# Patient Record
Sex: FEMALE | Race: WHITE | NOT HISPANIC OR LATINO | Employment: FULL TIME | URBAN - METROPOLITAN AREA
[De-identification: names, ages, dates, MRNs, and addresses within clinical notes are randomized per-mention and may not be internally consistent; named-entity substitution may affect disease eponyms.]

---

## 2022-05-02 ENCOUNTER — PRE-ADMISSION TESTING (OUTPATIENT)
Dept: ADMISSIONS | Facility: MEDICAL CENTER | Age: 42
DRG: 621 | End: 2022-05-02
Attending: COLON & RECTAL SURGERY
Payer: COMMERCIAL

## 2022-05-02 RX ORDER — ZOLPIDEM TARTRATE 10 MG/1
10 TABLET ORAL NIGHTLY PRN
COMMUNITY

## 2022-05-03 PROCEDURE — RXMED WILLOW AMBULATORY MEDICATION CHARGE: Performed by: COLON & RECTAL SURGERY

## 2022-05-11 ENCOUNTER — HOSPITAL ENCOUNTER (INPATIENT)
Facility: MEDICAL CENTER | Age: 42
LOS: 1 days | DRG: 621 | End: 2022-05-12
Attending: COLON & RECTAL SURGERY | Admitting: COLON & RECTAL SURGERY
Payer: COMMERCIAL

## 2022-05-11 ENCOUNTER — PHARMACY VISIT (OUTPATIENT)
Dept: PHARMACY | Facility: MEDICAL CENTER | Age: 42
End: 2022-05-11
Payer: COMMERCIAL

## 2022-05-11 ENCOUNTER — ANESTHESIA EVENT (OUTPATIENT)
Dept: SURGERY | Facility: MEDICAL CENTER | Age: 42
DRG: 621 | End: 2022-05-11
Payer: COMMERCIAL

## 2022-05-11 ENCOUNTER — ANESTHESIA (OUTPATIENT)
Dept: SURGERY | Facility: MEDICAL CENTER | Age: 42
DRG: 621 | End: 2022-05-11
Payer: COMMERCIAL

## 2022-05-11 LAB
25(OH)D3 SERPL-MCNC: 44 NG/ML (ref 30–100)
ALBUMIN SERPL BCP-MCNC: 4.2 G/DL (ref 3.2–4.9)
ALBUMIN/GLOB SERPL: 1.6 G/DL
ALP SERPL-CCNC: 81 U/L (ref 30–99)
ALT SERPL-CCNC: 17 U/L (ref 2–50)
ANION GAP SERPL CALC-SCNC: 13 MMOL/L (ref 7–16)
AST SERPL-CCNC: 17 U/L (ref 12–45)
BASOPHILS # BLD AUTO: 0.3 % (ref 0–1.8)
BASOPHILS # BLD: 0.03 K/UL (ref 0–0.12)
BILIRUB SERPL-MCNC: 0.5 MG/DL (ref 0.1–1.5)
BUN SERPL-MCNC: 12 MG/DL (ref 8–22)
CALCIUM SERPL-MCNC: 9 MG/DL (ref 8.5–10.5)
CHLORIDE SERPL-SCNC: 102 MMOL/L (ref 96–112)
CHOLEST SERPL-MCNC: 225 MG/DL (ref 100–199)
CO2 SERPL-SCNC: 23 MMOL/L (ref 20–33)
CREAT SERPL-MCNC: 0.58 MG/DL (ref 0.5–1.4)
EOSINOPHIL # BLD AUTO: 0.13 K/UL (ref 0–0.51)
EOSINOPHIL NFR BLD: 1.5 % (ref 0–6.9)
ERYTHROCYTE [DISTWIDTH] IN BLOOD BY AUTOMATED COUNT: 37.7 FL (ref 35.9–50)
FERRITIN SERPL-MCNC: 171 NG/ML (ref 10–291)
FOLATE SERPL-MCNC: 21.1 NG/ML
GFR SERPLBLD CREATININE-BSD FMLA CKD-EPI: 116 ML/MIN/1.73 M 2
GLOBULIN SER CALC-MCNC: 2.7 G/DL (ref 1.9–3.5)
GLUCOSE SERPL-MCNC: 84 MG/DL (ref 65–99)
HCG SERPL QL: NEGATIVE
HCT VFR BLD AUTO: 40.1 % (ref 37–47)
HDLC SERPL-MCNC: 36 MG/DL
HGB BLD-MCNC: 13.9 G/DL (ref 12–16)
IMM GRANULOCYTES # BLD AUTO: 0.04 K/UL (ref 0–0.11)
IMM GRANULOCYTES NFR BLD AUTO: 0.5 % (ref 0–0.9)
INR PPP: 1 (ref 0.87–1.13)
IRON SERPL-MCNC: 47 UG/DL (ref 40–170)
LDLC SERPL CALC-MCNC: 140 MG/DL
LYMPHOCYTES # BLD AUTO: 2.25 K/UL (ref 1–4.8)
LYMPHOCYTES NFR BLD: 26.2 % (ref 22–41)
MCH RBC QN AUTO: 30.7 PG (ref 27–33)
MCHC RBC AUTO-ENTMCNC: 34.7 G/DL (ref 33.6–35)
MCV RBC AUTO: 88.5 FL (ref 81.4–97.8)
MONOCYTES # BLD AUTO: 0.51 K/UL (ref 0–0.85)
MONOCYTES NFR BLD AUTO: 5.9 % (ref 0–13.4)
NEUTROPHILS # BLD AUTO: 5.63 K/UL (ref 2–7.15)
NEUTROPHILS NFR BLD: 65.6 % (ref 44–72)
NRBC # BLD AUTO: 0 K/UL
NRBC BLD-RTO: 0 /100 WBC
PLATELET # BLD AUTO: 245 K/UL (ref 164–446)
PMV BLD AUTO: 9.8 FL (ref 9–12.9)
POTASSIUM SERPL-SCNC: 3.7 MMOL/L (ref 3.6–5.5)
PREALB SERPL-MCNC: 25.6 MG/DL (ref 18–38)
PROT SERPL-MCNC: 6.9 G/DL (ref 6–8.2)
PROTHROMBIN TIME: 12.9 SEC (ref 12–14.6)
RBC # BLD AUTO: 4.53 M/UL (ref 4.2–5.4)
SODIUM SERPL-SCNC: 138 MMOL/L (ref 135–145)
TRANSFERRIN SERPL-MCNC: 243 MG/DL (ref 200–370)
TRIGL SERPL-MCNC: 247 MG/DL (ref 0–149)
VIT B12 SERPL-MCNC: 528 PG/ML (ref 211–911)
WBC # BLD AUTO: 8.6 K/UL (ref 4.8–10.8)

## 2022-05-11 PROCEDURE — 700101 HCHG RX REV CODE 250: Performed by: COLON & RECTAL SURGERY

## 2022-05-11 PROCEDURE — 502570 HCHG PACK, GASTRIC BANDING: Performed by: COLON & RECTAL SURGERY

## 2022-05-11 PROCEDURE — 84134 ASSAY OF PREALBUMIN: CPT

## 2022-05-11 PROCEDURE — 700101 HCHG RX REV CODE 250: Performed by: STUDENT IN AN ORGANIZED HEALTH CARE EDUCATION/TRAINING PROGRAM

## 2022-05-11 PROCEDURE — 84207 ASSAY OF VITAMIN B-6: CPT

## 2022-05-11 PROCEDURE — 84630 ASSAY OF ZINC: CPT

## 2022-05-11 PROCEDURE — 0BQT4ZZ REPAIR DIAPHRAGM, PERCUTANEOUS ENDOSCOPIC APPROACH: ICD-10-PCS | Performed by: COLON & RECTAL SURGERY

## 2022-05-11 PROCEDURE — 700111 HCHG RX REV CODE 636 W/ 250 OVERRIDE (IP): Performed by: COLON & RECTAL SURGERY

## 2022-05-11 PROCEDURE — 82306 VITAMIN D 25 HYDROXY: CPT

## 2022-05-11 PROCEDURE — 82728 ASSAY OF FERRITIN: CPT

## 2022-05-11 PROCEDURE — 84425 ASSAY OF VITAMIN B-1: CPT

## 2022-05-11 PROCEDURE — 700105 HCHG RX REV CODE 258: Performed by: COLON & RECTAL SURGERY

## 2022-05-11 PROCEDURE — A9270 NON-COVERED ITEM OR SERVICE: HCPCS | Performed by: COLON & RECTAL SURGERY

## 2022-05-11 PROCEDURE — 83540 ASSAY OF IRON: CPT

## 2022-05-11 PROCEDURE — A9270 NON-COVERED ITEM OR SERVICE: HCPCS | Performed by: STUDENT IN AN ORGANIZED HEALTH CARE EDUCATION/TRAINING PROGRAM

## 2022-05-11 PROCEDURE — 80061 LIPID PANEL: CPT

## 2022-05-11 PROCEDURE — 160041 HCHG SURGERY MINUTES - EA ADDL 1 MIN LEVEL 4: Performed by: COLON & RECTAL SURGERY

## 2022-05-11 PROCEDURE — 501570 HCHG TROCAR, SEPARATOR: Performed by: COLON & RECTAL SURGERY

## 2022-05-11 PROCEDURE — 700101 HCHG RX REV CODE 250: Performed by: ANESTHESIOLOGY

## 2022-05-11 PROCEDURE — 160029 HCHG SURGERY MINUTES - 1ST 30 MINS LEVEL 4: Performed by: COLON & RECTAL SURGERY

## 2022-05-11 PROCEDURE — 80053 COMPREHEN METABOLIC PANEL: CPT

## 2022-05-11 PROCEDURE — 700102 HCHG RX REV CODE 250 W/ 637 OVERRIDE(OP): Performed by: COLON & RECTAL SURGERY

## 2022-05-11 PROCEDURE — 84703 CHORIONIC GONADOTROPIN ASSAY: CPT

## 2022-05-11 PROCEDURE — 160035 HCHG PACU - 1ST 60 MINS PHASE I: Performed by: COLON & RECTAL SURGERY

## 2022-05-11 PROCEDURE — 160002 HCHG RECOVERY MINUTES (STAT): Performed by: COLON & RECTAL SURGERY

## 2022-05-11 PROCEDURE — 501399 HCHG SPECIMAN BAG, ENDO CATC: Performed by: COLON & RECTAL SURGERY

## 2022-05-11 PROCEDURE — 700102 HCHG RX REV CODE 250 W/ 637 OVERRIDE(OP): Performed by: ANESTHESIOLOGY

## 2022-05-11 PROCEDURE — 85025 COMPLETE CBC W/AUTO DIFF WBC: CPT

## 2022-05-11 PROCEDURE — 160048 HCHG OR STATISTICAL LEVEL 1-5: Performed by: COLON & RECTAL SURGERY

## 2022-05-11 PROCEDURE — 88307 TISSUE EXAM BY PATHOLOGIST: CPT

## 2022-05-11 PROCEDURE — 82746 ASSAY OF FOLIC ACID SERUM: CPT

## 2022-05-11 PROCEDURE — 700111 HCHG RX REV CODE 636 W/ 250 OVERRIDE (IP): Performed by: STUDENT IN AN ORGANIZED HEALTH CARE EDUCATION/TRAINING PROGRAM

## 2022-05-11 PROCEDURE — A9270 NON-COVERED ITEM OR SERVICE: HCPCS | Performed by: ANESTHESIOLOGY

## 2022-05-11 PROCEDURE — 700111 HCHG RX REV CODE 636 W/ 250 OVERRIDE (IP): Performed by: ANESTHESIOLOGY

## 2022-05-11 PROCEDURE — 84466 ASSAY OF TRANSFERRIN: CPT

## 2022-05-11 PROCEDURE — 700102 HCHG RX REV CODE 250 W/ 637 OVERRIDE(OP): Performed by: STUDENT IN AN ORGANIZED HEALTH CARE EDUCATION/TRAINING PROGRAM

## 2022-05-11 PROCEDURE — 501838 HCHG SUTURE GENERAL: Performed by: COLON & RECTAL SURGERY

## 2022-05-11 PROCEDURE — 160009 HCHG ANES TIME/MIN: Performed by: COLON & RECTAL SURGERY

## 2022-05-11 PROCEDURE — 82607 VITAMIN B-12: CPT

## 2022-05-11 PROCEDURE — 770001 HCHG ROOM/CARE - MED/SURG/GYN PRIV*

## 2022-05-11 PROCEDURE — 0DB64Z3 EXCISION OF STOMACH, PERCUTANEOUS ENDOSCOPIC APPROACH, VERTICAL: ICD-10-PCS | Performed by: COLON & RECTAL SURGERY

## 2022-05-11 PROCEDURE — 00797 ANES IPER UPR ABD GSTR PX MO: CPT | Performed by: ANESTHESIOLOGY

## 2022-05-11 PROCEDURE — 501497 HCHG SURGICLIP: Performed by: COLON & RECTAL SURGERY

## 2022-05-11 PROCEDURE — 85610 PROTHROMBIN TIME: CPT

## 2022-05-11 PROCEDURE — 160036 HCHG PACU - EA ADDL 30 MINS PHASE I: Performed by: COLON & RECTAL SURGERY

## 2022-05-11 PROCEDURE — 502240 HCHG MISC OR SUPPLY RC 0272: Performed by: COLON & RECTAL SURGERY

## 2022-05-11 RX ORDER — ACETAMINOPHEN 10 MG/ML
1 INJECTION, SOLUTION INTRAVENOUS ONCE
Status: COMPLETED | OUTPATIENT
Start: 2022-05-11 | End: 2022-05-11

## 2022-05-11 RX ORDER — ALBUTEROL SULFATE 2.5 MG/3ML
2.5 SOLUTION RESPIRATORY (INHALATION)
Status: DISCONTINUED | OUTPATIENT
Start: 2022-05-11 | End: 2022-05-11 | Stop reason: HOSPADM

## 2022-05-11 RX ORDER — GABAPENTIN 300 MG/1
300 CAPSULE ORAL ONCE
Status: COMPLETED | OUTPATIENT
Start: 2022-05-11 | End: 2022-05-11

## 2022-05-11 RX ORDER — HYDROMORPHONE HYDROCHLORIDE 1 MG/ML
0.6 INJECTION, SOLUTION INTRAMUSCULAR; INTRAVENOUS; SUBCUTANEOUS
Status: DISCONTINUED | OUTPATIENT
Start: 2022-05-11 | End: 2022-05-11 | Stop reason: HOSPADM

## 2022-05-11 RX ORDER — ACETAMINOPHEN 500 MG
1000 TABLET ORAL EVERY 6 HOURS PRN
Status: DISCONTINUED | OUTPATIENT
Start: 2022-05-16 | End: 2022-05-12 | Stop reason: HOSPADM

## 2022-05-11 RX ORDER — DIPHENHYDRAMINE HYDROCHLORIDE 50 MG/ML
25 INJECTION INTRAMUSCULAR; INTRAVENOUS EVERY 6 HOURS PRN
Status: DISCONTINUED | OUTPATIENT
Start: 2022-05-11 | End: 2022-05-12 | Stop reason: HOSPADM

## 2022-05-11 RX ORDER — CEFOTETAN DISODIUM 2 G/20ML
INJECTION, POWDER, FOR SOLUTION INTRAMUSCULAR; INTRAVENOUS PRN
Status: DISCONTINUED | OUTPATIENT
Start: 2022-05-11 | End: 2022-05-11 | Stop reason: SURG

## 2022-05-11 RX ORDER — OXYCODONE HCL 5 MG/5 ML
10 SOLUTION, ORAL ORAL
Status: COMPLETED | OUTPATIENT
Start: 2022-05-11 | End: 2022-05-11

## 2022-05-11 RX ORDER — OXYCODONE HCL 5 MG/5 ML
5 SOLUTION, ORAL ORAL
Status: DISCONTINUED | OUTPATIENT
Start: 2022-05-11 | End: 2022-05-12

## 2022-05-11 RX ORDER — DEXAMETHASONE SODIUM PHOSPHATE 4 MG/ML
INJECTION, SOLUTION INTRA-ARTICULAR; INTRALESIONAL; INTRAMUSCULAR; INTRAVENOUS; SOFT TISSUE PRN
Status: DISCONTINUED | OUTPATIENT
Start: 2022-05-11 | End: 2022-05-11 | Stop reason: SURG

## 2022-05-11 RX ORDER — SODIUM CHLORIDE, SODIUM LACTATE, POTASSIUM CHLORIDE, CALCIUM CHLORIDE 600; 310; 30; 20 MG/100ML; MG/100ML; MG/100ML; MG/100ML
INJECTION, SOLUTION INTRAVENOUS CONTINUOUS
Status: ACTIVE | OUTPATIENT
Start: 2022-05-11 | End: 2022-05-11

## 2022-05-11 RX ORDER — DIPHENHYDRAMINE HYDROCHLORIDE 50 MG/ML
12.5 INJECTION INTRAMUSCULAR; INTRAVENOUS EVERY 6 HOURS PRN
Status: DISCONTINUED | OUTPATIENT
Start: 2022-05-11 | End: 2022-05-12 | Stop reason: HOSPADM

## 2022-05-11 RX ORDER — SODIUM CHLORIDE AND POTASSIUM CHLORIDE 150; 900 MG/100ML; MG/100ML
INJECTION, SOLUTION INTRAVENOUS CONTINUOUS
Status: DISCONTINUED | OUTPATIENT
Start: 2022-05-11 | End: 2022-05-12 | Stop reason: HOSPADM

## 2022-05-11 RX ORDER — ACETAMINOPHEN 500 MG
1000 TABLET ORAL EVERY 6 HOURS
Status: DISCONTINUED | OUTPATIENT
Start: 2022-05-12 | End: 2022-05-12 | Stop reason: HOSPADM

## 2022-05-11 RX ORDER — CALCIUM CARBONATE 500 MG/1
500 TABLET, CHEWABLE ORAL
Status: DISCONTINUED | OUTPATIENT
Start: 2022-05-11 | End: 2022-05-12 | Stop reason: HOSPADM

## 2022-05-11 RX ORDER — ACETAMINOPHEN 10 MG/ML
1000 INJECTION, SOLUTION INTRAVENOUS EVERY 6 HOURS
Status: COMPLETED | OUTPATIENT
Start: 2022-05-11 | End: 2022-05-12

## 2022-05-11 RX ORDER — OXYCODONE HCL 5 MG/5 ML
10 SOLUTION, ORAL ORAL
Status: DISCONTINUED | OUTPATIENT
Start: 2022-05-11 | End: 2022-05-12

## 2022-05-11 RX ORDER — MEPERIDINE HYDROCHLORIDE 25 MG/ML
12.5 INJECTION INTRAMUSCULAR; INTRAVENOUS; SUBCUTANEOUS
Status: DISCONTINUED | OUTPATIENT
Start: 2022-05-11 | End: 2022-05-11 | Stop reason: HOSPADM

## 2022-05-11 RX ORDER — ENALAPRILAT 1.25 MG/ML
2.5 INJECTION INTRAVENOUS EVERY 6 HOURS PRN
Status: DISCONTINUED | OUTPATIENT
Start: 2022-05-11 | End: 2022-05-12 | Stop reason: HOSPADM

## 2022-05-11 RX ORDER — DIPHENHYDRAMINE HYDROCHLORIDE 50 MG/ML
12.5 INJECTION INTRAMUSCULAR; INTRAVENOUS
Status: DISCONTINUED | OUTPATIENT
Start: 2022-05-11 | End: 2022-05-11 | Stop reason: HOSPADM

## 2022-05-11 RX ORDER — OXYCODONE HCL 10 MG/1
10 TABLET, FILM COATED, EXTENDED RELEASE ORAL ONCE
Status: COMPLETED | OUTPATIENT
Start: 2022-05-11 | End: 2022-05-11

## 2022-05-11 RX ORDER — SODIUM CHLORIDE, SODIUM LACTATE, POTASSIUM CHLORIDE, AND CALCIUM CHLORIDE .6; .31; .03; .02 G/100ML; G/100ML; G/100ML; G/100ML
500 INJECTION, SOLUTION INTRAVENOUS
Status: DISCONTINUED | OUTPATIENT
Start: 2022-05-11 | End: 2022-05-12 | Stop reason: HOSPADM

## 2022-05-11 RX ORDER — BUPIVACAINE HYDROCHLORIDE AND EPINEPHRINE 5; 5 MG/ML; UG/ML
INJECTION, SOLUTION PERINEURAL
Status: DISCONTINUED | OUTPATIENT
Start: 2022-05-11 | End: 2022-05-11 | Stop reason: HOSPADM

## 2022-05-11 RX ORDER — PROMETHAZINE HYDROCHLORIDE 25 MG/1
25 SUPPOSITORY RECTAL EVERY 4 HOURS PRN
Status: DISCONTINUED | OUTPATIENT
Start: 2022-05-11 | End: 2022-05-12 | Stop reason: HOSPADM

## 2022-05-11 RX ORDER — ENOXAPARIN SODIUM 100 MG/ML
40 INJECTION SUBCUTANEOUS
Status: DISCONTINUED | OUTPATIENT
Start: 2022-05-12 | End: 2022-05-12 | Stop reason: HOSPADM

## 2022-05-11 RX ORDER — HYDROMORPHONE HYDROCHLORIDE 1 MG/ML
0.2 INJECTION, SOLUTION INTRAMUSCULAR; INTRAVENOUS; SUBCUTANEOUS
Status: DISCONTINUED | OUTPATIENT
Start: 2022-05-11 | End: 2022-05-11 | Stop reason: HOSPADM

## 2022-05-11 RX ORDER — SCOLOPAMINE TRANSDERMAL SYSTEM 1 MG/1
1 PATCH, EXTENDED RELEASE TRANSDERMAL
Status: DISCONTINUED | OUTPATIENT
Start: 2022-05-11 | End: 2022-05-12 | Stop reason: HOSPADM

## 2022-05-11 RX ORDER — ONDANSETRON 2 MG/ML
4 INJECTION INTRAMUSCULAR; INTRAVENOUS EVERY 4 HOURS PRN
Status: DISCONTINUED | OUTPATIENT
Start: 2022-05-11 | End: 2022-05-12 | Stop reason: HOSPADM

## 2022-05-11 RX ORDER — HALOPERIDOL 5 MG/ML
1 INJECTION INTRAMUSCULAR
Status: DISCONTINUED | OUTPATIENT
Start: 2022-05-11 | End: 2022-05-11 | Stop reason: HOSPADM

## 2022-05-11 RX ORDER — ONDANSETRON 2 MG/ML
4 INJECTION INTRAMUSCULAR; INTRAVENOUS
Status: DISCONTINUED | OUTPATIENT
Start: 2022-05-11 | End: 2022-05-11 | Stop reason: HOSPADM

## 2022-05-11 RX ORDER — DIPHENHYDRAMINE HCL 25 MG
25 TABLET ORAL EVERY 6 HOURS PRN
Status: DISCONTINUED | OUTPATIENT
Start: 2022-05-11 | End: 2022-05-12 | Stop reason: HOSPADM

## 2022-05-11 RX ORDER — OXYCODONE HCL 5 MG/5 ML
5 SOLUTION, ORAL ORAL
Status: COMPLETED | OUTPATIENT
Start: 2022-05-11 | End: 2022-05-11

## 2022-05-11 RX ORDER — GABAPENTIN 300 MG/1
300 CAPSULE ORAL 3 TIMES DAILY
Status: DISCONTINUED | OUTPATIENT
Start: 2022-05-11 | End: 2022-05-12 | Stop reason: HOSPADM

## 2022-05-11 RX ORDER — SODIUM CHLORIDE, SODIUM GLUCONATE, SODIUM ACETATE, POTASSIUM CHLORIDE AND MAGNESIUM CHLORIDE 526; 502; 368; 37; 30 MG/100ML; MG/100ML; MG/100ML; MG/100ML; MG/100ML
500 INJECTION, SOLUTION INTRAVENOUS CONTINUOUS
Status: DISCONTINUED | OUTPATIENT
Start: 2022-05-11 | End: 2022-05-11 | Stop reason: HOSPADM

## 2022-05-11 RX ORDER — HYDROMORPHONE HYDROCHLORIDE 1 MG/ML
0.5 INJECTION, SOLUTION INTRAMUSCULAR; INTRAVENOUS; SUBCUTANEOUS
Status: DISCONTINUED | OUTPATIENT
Start: 2022-05-11 | End: 2022-05-12 | Stop reason: HOSPADM

## 2022-05-11 RX ORDER — HYDRALAZINE HYDROCHLORIDE 20 MG/ML
5 INJECTION INTRAMUSCULAR; INTRAVENOUS
Status: DISCONTINUED | OUTPATIENT
Start: 2022-05-11 | End: 2022-05-11 | Stop reason: HOSPADM

## 2022-05-11 RX ORDER — HYDROMORPHONE HYDROCHLORIDE 1 MG/ML
0.4 INJECTION, SOLUTION INTRAMUSCULAR; INTRAVENOUS; SUBCUTANEOUS
Status: DISCONTINUED | OUTPATIENT
Start: 2022-05-11 | End: 2022-05-11 | Stop reason: HOSPADM

## 2022-05-11 RX ORDER — ONDANSETRON 2 MG/ML
INJECTION INTRAMUSCULAR; INTRAVENOUS PRN
Status: DISCONTINUED | OUTPATIENT
Start: 2022-05-11 | End: 2022-05-11 | Stop reason: SURG

## 2022-05-11 RX ORDER — HALOPERIDOL 5 MG/ML
1 INJECTION INTRAMUSCULAR EVERY 6 HOURS PRN
Status: DISCONTINUED | OUTPATIENT
Start: 2022-05-11 | End: 2022-05-12 | Stop reason: HOSPADM

## 2022-05-11 RX ADMIN — ACETAMINOPHEN 1 G: 10 INJECTION, SOLUTION INTRAVENOUS at 15:36

## 2022-05-11 RX ADMIN — ONDANSETRON 4 MG: 2 INJECTION INTRAMUSCULAR; INTRAVENOUS at 16:43

## 2022-05-11 RX ADMIN — OXYCODONE HYDROCHLORIDE 10 MG: 10 TABLET, FILM COATED, EXTENDED RELEASE ORAL at 15:23

## 2022-05-11 RX ADMIN — SODIUM CHLORIDE, POTASSIUM CHLORIDE, SODIUM LACTATE AND CALCIUM CHLORIDE: 600; 310; 30; 20 INJECTION, SOLUTION INTRAVENOUS at 15:36

## 2022-05-11 RX ADMIN — HYDROMORPHONE HYDROCHLORIDE 0.2 MG: 1 INJECTION, SOLUTION INTRAMUSCULAR; INTRAVENOUS; SUBCUTANEOUS at 18:25

## 2022-05-11 RX ADMIN — CEFOTETAN DISODIUM 2 G: 2 INJECTION, POWDER, FOR SOLUTION INTRAMUSCULAR; INTRAVENOUS at 16:12

## 2022-05-11 RX ADMIN — GABAPENTIN 300 MG: 300 CAPSULE ORAL at 15:24

## 2022-05-11 RX ADMIN — GABAPENTIN 300 MG: 300 CAPSULE ORAL at 21:15

## 2022-05-11 RX ADMIN — SUGAMMADEX 200 MG: 100 INJECTION, SOLUTION INTRAVENOUS at 16:50

## 2022-05-11 RX ADMIN — DEXAMETHASONE SODIUM PHOSPHATE 4 MG: 4 INJECTION, SOLUTION INTRA-ARTICULAR; INTRALESIONAL; INTRAMUSCULAR; INTRAVENOUS; SOFT TISSUE at 16:43

## 2022-05-11 RX ADMIN — MIDAZOLAM 2 MG: 1 INJECTION INTRAMUSCULAR; INTRAVENOUS at 16:06

## 2022-05-11 RX ADMIN — HYDROMORPHONE HYDROCHLORIDE 0.4 MG: 1 INJECTION, SOLUTION INTRAMUSCULAR; INTRAVENOUS; SUBCUTANEOUS at 17:41

## 2022-05-11 RX ADMIN — POTASSIUM CHLORIDE AND SODIUM CHLORIDE: 900; 150 INJECTION, SOLUTION INTRAVENOUS at 20:30

## 2022-05-11 RX ADMIN — OXYCODONE HYDROCHLORIDE 5 MG: 5 SOLUTION ORAL at 21:17

## 2022-05-11 RX ADMIN — FAMOTIDINE 20 MG: 10 INJECTION INTRAVENOUS at 21:15

## 2022-05-11 RX ADMIN — ACETAMINOPHEN 1000 MG: 10 INJECTION, SOLUTION INTRAVENOUS at 23:11

## 2022-05-11 RX ADMIN — HYDROMORPHONE HYDROCHLORIDE 0.4 MG: 1 INJECTION, SOLUTION INTRAMUSCULAR; INTRAVENOUS; SUBCUTANEOUS at 18:09

## 2022-05-11 RX ADMIN — FENTANYL CITRATE 50 MCG: 50 INJECTION, SOLUTION INTRAMUSCULAR; INTRAVENOUS at 17:05

## 2022-05-11 RX ADMIN — OXYCODONE HYDROCHLORIDE 10 MG: 5 SOLUTION ORAL at 17:23

## 2022-05-11 RX ADMIN — POTASSIUM CHLORIDE AND SODIUM CHLORIDE: 900; 150 INJECTION, SOLUTION INTRAVENOUS at 21:48

## 2022-05-11 RX ADMIN — FENTANYL CITRATE 100 MCG: 50 INJECTION, SOLUTION INTRAMUSCULAR; INTRAVENOUS at 16:08

## 2022-05-11 RX ADMIN — SCOPALAMINE 1 PATCH: 1 PATCH, EXTENDED RELEASE TRANSDERMAL at 15:23

## 2022-05-11 RX ADMIN — PROPOFOL 200 MG: 10 INJECTION, EMULSION INTRAVENOUS at 16:09

## 2022-05-11 RX ADMIN — FENTANYL CITRATE 50 MCG: 50 INJECTION, SOLUTION INTRAMUSCULAR; INTRAVENOUS at 17:23

## 2022-05-11 ASSESSMENT — LIFESTYLE VARIABLES
DOES PATIENT WANT TO STOP DRINKING: NO
ON A TYPICAL DAY WHEN YOU DRINK ALCOHOL HOW MANY DRINKS DO YOU HAVE: 0
EVER HAD A DRINK FIRST THING IN THE MORNING TO STEADY YOUR NERVES TO GET RID OF A HANGOVER: NO
HAVE PEOPLE ANNOYED YOU BY CRITICIZING YOUR DRINKING: NO
HAVE YOU EVER FELT YOU SHOULD CUT DOWN ON YOUR DRINKING: NO
TOTAL SCORE: 0
TOTAL SCORE: 0
EVER FELT BAD OR GUILTY ABOUT YOUR DRINKING: NO
TOTAL SCORE: 0
AVERAGE NUMBER OF DAYS PER WEEK YOU HAVE A DRINK CONTAINING ALCOHOL: 0
ALCOHOL_USE: NO
CONSUMPTION TOTAL: NEGATIVE
HOW MANY TIMES IN THE PAST YEAR HAVE YOU HAD 5 OR MORE DRINKS IN A DAY: 0

## 2022-05-11 ASSESSMENT — PAIN SCALES - GENERAL: PAIN_LEVEL: 1

## 2022-05-11 ASSESSMENT — PAIN DESCRIPTION - PAIN TYPE
TYPE: ACUTE PAIN;SURGICAL PAIN

## 2022-05-11 ASSESSMENT — FIBROSIS 4 INDEX: FIB4 SCORE: 0.69

## 2022-05-11 NOTE — ANESTHESIA PREPROCEDURE EVALUATION
Case: [id number] Date/Time: 05/11/22 1645    Procedure: GASTRECTOMY, SLEEVE, LAPAROSCOPIC    Pre-op diagnosis: MORBID OBESITY    Location: [place] [address] / SURGERY [facility]    Surgeons: Silvio Vivas M.D.          Relevant Problems   No relevant active problems       Physical Exam    Airway   Mallampati: II  TM distance: >3 FB  Neck ROM: full       Cardiovascular - normal exam  Rhythm: regular  Rate: normal  (-) murmur     Dental - normal exam           Pulmonary - normal exam  Breath sounds clear to auscultation     Abdominal    Neurological - normal exam                 Anesthesia Plan    ASA 3   ASA physical status 3 criteria: morbid obesity - BMI greater than or equal to 40    Plan - general       Airway plan will be ETT          Induction: intravenous    Postoperative Plan: Postoperative administration of opioids is intended.    Pertinent diagnostic labs and testing reviewed    Informed Consent:    Anesthetic plan and risks discussed with patient.    Use of blood products discussed with: patient whom consented to blood products.

## 2022-05-11 NOTE — OR NURSING
1657 Pt arrived to PACU with Anesthesiologist and OR RN. AAOx4. Even, unlabored respirations. VSS. C/o abdominal pain, see MAR and flowsheets for PRN medication administration. Denies nausea. Abdominal lap sites x4 CDI. Ice pack applied.    1812 POC update given to Nehemiah, spouse, over the phone. All questions answered.     1900 Pt meets floor criteria.     1923 Report called to FABRICIO Lan on [address].     1948 Pt transported to [facility] with RN. Chart, belongings, and full oxygen tank with patient.

## 2022-05-11 NOTE — OP REPORT
NAME:  Maeve Snider  MRN:  [id number]  :  1980      DATE OF OPERATION: 2022    PREOPERATIVE DIAGNOSIS: Morbid Obesity with medical sequelae    POSTOPERATIVE DIAGNOSIS: Morbid Obesity with medical sequelae; Hiatal hernia    OPERATION PERFORMED: 1.  Laparoscopic Sleeve Gastrectomy  2.  Cruroplasty hiatal hernia repair    SURGEON: Silvio Vivas MD    ASSISTANT:  Janna Aguirre PA-C, PA-C    ANESTHESIOLOGIST:  Anesthesiologist: Anshu Oscar M.D.    ANESTHESIA: General endotracheal anesthesia.     SPECIMEN: Stomach    ESTIMATED BLOOD LOSS: <10cc.     INDICATIONS: The patient is a 41 y.o. female with a diagnosis of morbid obesity with medical sequelae. She is taken to the operating room today for Laparoscopic Sleeve Gastrectomy.     PROCEDURE: Following informed consent, the patient was properly identified, taken to the operating room, and placed in the supine position where general endotracheal anesthesia was administered. Intravenous antibiotics were administered by the anesthesiologist in the correct time interval. Sequential compression devices were employed. The abdomen was prepped and draped into a sterile field.     An optical entry bladeless  trocar was utilized and pneumoperitoneum carefully established in the usual fashion.  The bladeless 5 mm separator trocar was introduced and the 5 mm lens/camera was passed into the peritoneal cavity.  Three additional separator trocars were placed under direct vision.  A 5 mm Harleen-type liver retractor was placed into position.  This was used to elevate the left sided segment of the liver.  It was secured to the patients right side with a robot arm.  Careful inspection revealed no untoward events with placement of the trocars.    The gastrocolic omentum was examined and dissected with the ligasure device and a point on the distal antrum was selected to begin the sleeve gastrectomy.  A 40 Amharic bougie was then passed down into the antrum.  A careful  inspection at the hiatus demonstrated a 3 cm hiatal hernia which would require repair or risk significant reflux. An [employer] linear stapler with a thick-tissue cartridges, was employed to divide partway across the stomach.  With the bougie in position, the stapler was then used to march proximally along the stomach and transsection of the stomach was performed, beginning 5 cm proximal to the pylorus in the method of the sleeve gastrectomy.  The greater curvature aspect of the stomach was then dissected and the greater curvature vessels and short gastric vessels were divided with the ligasure, and use of some hemoclips.  The hiataus and crura were exposed, The herniated fat was reduced. 2cm or more of intraabdominal esophagus was freed.    The last endomechanical stapler firings were used to complete the transection of the stomach.  Hemoclips were used if any oozing along staple line locations. Careful inspection of the staple line demonstrated excellent, meticulous hemostasis and a completely intact staple line with seemless tissue approximation.  Seromuscular sutures were placed using polysorb suture to further secure the staple line.  The hiatal hernia was then repaired using 0 Ethibond endostitches as figure-of-eight anterior cruroplasty. The bougie was then removed.     The large endocatch bag was then used to retrieve the stomach specimen.  Tisseal fibrin glue sealant was sprayed along the entire staple line. The ports were removed under direct vision and the pneumoperitoneum was allowed to escape. The fascia of this port was closed with 0-Vicryl suture.  The port sites were then irrigated well.  The port site skin incisions were closed with interrupted 4-0 Vicryl subcuticular sutures.  Steri-Strips and Benzoin were applied beneath sterile Band-Aids.     The patient tolerated the procedure well and there were no apparent complications. All sponge, needle, and instrument counts were correct on 2 separate  occasions. She was awakened, extubated, and transferred to the recovery room in satisfactory condition.       ____________________________________   Silvio Vivas MD  DD: 5/11/2022  4:53 PM    CC:  [facility];

## 2022-05-11 NOTE — ANESTHESIA PROCEDURE NOTES
Airway    Date/Time: 5/11/2022 4:10 PM  Performed by: Anshu Oscar M.D.  Authorized by: Anshu Oscar M.D.     Location:  OR  Urgency:  Elective  Indications for Airway Management:  Anesthesia      Spontaneous Ventilation: absent    Sedation Level:  Deep  Preoxygenated: Yes    Patient Position:  Sniffing  Final Airway Type:  Endotracheal airway  Final Endotracheal Airway:  ETT  Cuffed: Yes    Technique Used for Successful ETT Placement:  Direct laryngoscopy    Insertion Site:  Oral  Blade Type:  Thee  Laryngoscope Blade/Videolaryngoscope Blade Size:  3  ETT Size (mm):  7.5  Measured from:  Teeth  ETT to Teeth (cm):  22  Placement Verified by: auscultation and capnometry    Cormack-Lehane Classification:  Grade I - full view of glottis  Number of Attempts at Approach:  1

## 2022-05-12 VITALS
HEART RATE: 89 BPM | SYSTOLIC BLOOD PRESSURE: 143 MMHG | BODY MASS INDEX: 42.34 KG/M2 | RESPIRATION RATE: 18 BRPM | TEMPERATURE: 97.9 F | DIASTOLIC BLOOD PRESSURE: 86 MMHG | HEIGHT: 66 IN | OXYGEN SATURATION: 94 % | WEIGHT: 263.45 LBS

## 2022-05-12 LAB
ALBUMIN SERPL BCP-MCNC: 3.9 G/DL (ref 3.2–4.9)
ALBUMIN/GLOB SERPL: 1.3 G/DL
ALP SERPL-CCNC: 77 U/L (ref 30–99)
ALT SERPL-CCNC: 21 U/L (ref 2–50)
ANION GAP SERPL CALC-SCNC: 10 MMOL/L (ref 7–16)
AST SERPL-CCNC: 22 U/L (ref 12–45)
BILIRUB SERPL-MCNC: 0.3 MG/DL (ref 0.1–1.5)
BUN SERPL-MCNC: 13 MG/DL (ref 8–22)
CALCIUM SERPL-MCNC: 8.4 MG/DL (ref 8.5–10.5)
CHLORIDE SERPL-SCNC: 102 MMOL/L (ref 96–112)
CO2 SERPL-SCNC: 24 MMOL/L (ref 20–33)
CREAT SERPL-MCNC: 0.62 MG/DL (ref 0.5–1.4)
ERYTHROCYTE [DISTWIDTH] IN BLOOD BY AUTOMATED COUNT: 39.4 FL (ref 35.9–50)
GFR SERPLBLD CREATININE-BSD FMLA CKD-EPI: 114 ML/MIN/1.73 M 2
GLOBULIN SER CALC-MCNC: 3.1 G/DL (ref 1.9–3.5)
GLUCOSE SERPL-MCNC: 151 MG/DL (ref 65–99)
HCT VFR BLD AUTO: 39.2 % (ref 37–47)
HGB BLD-MCNC: 13.1 G/DL (ref 12–16)
MCH RBC QN AUTO: 30.3 PG (ref 27–33)
MCHC RBC AUTO-ENTMCNC: 33.4 G/DL (ref 33.6–35)
MCV RBC AUTO: 90.5 FL (ref 81.4–97.8)
PATHOLOGY CONSULT NOTE: NORMAL
PLATELET # BLD AUTO: 251 K/UL (ref 164–446)
PMV BLD AUTO: 9.7 FL (ref 9–12.9)
POTASSIUM SERPL-SCNC: 4.6 MMOL/L (ref 3.6–5.5)
PROT SERPL-MCNC: 7 G/DL (ref 6–8.2)
RBC # BLD AUTO: 4.33 M/UL (ref 4.2–5.4)
SODIUM SERPL-SCNC: 136 MMOL/L (ref 135–145)
WBC # BLD AUTO: 16 K/UL (ref 4.8–10.8)

## 2022-05-12 PROCEDURE — 700102 HCHG RX REV CODE 250 W/ 637 OVERRIDE(OP): Performed by: STUDENT IN AN ORGANIZED HEALTH CARE EDUCATION/TRAINING PROGRAM

## 2022-05-12 PROCEDURE — 700101 HCHG RX REV CODE 250: Performed by: STUDENT IN AN ORGANIZED HEALTH CARE EDUCATION/TRAINING PROGRAM

## 2022-05-12 PROCEDURE — 85027 COMPLETE CBC AUTOMATED: CPT

## 2022-05-12 PROCEDURE — 700102 HCHG RX REV CODE 250 W/ 637 OVERRIDE(OP): Performed by: COLON & RECTAL SURGERY

## 2022-05-12 PROCEDURE — A9270 NON-COVERED ITEM OR SERVICE: HCPCS | Performed by: STUDENT IN AN ORGANIZED HEALTH CARE EDUCATION/TRAINING PROGRAM

## 2022-05-12 PROCEDURE — A9270 NON-COVERED ITEM OR SERVICE: HCPCS | Performed by: COLON & RECTAL SURGERY

## 2022-05-12 PROCEDURE — 80053 COMPREHEN METABOLIC PANEL: CPT

## 2022-05-12 PROCEDURE — 700111 HCHG RX REV CODE 636 W/ 250 OVERRIDE (IP): Performed by: STUDENT IN AN ORGANIZED HEALTH CARE EDUCATION/TRAINING PROGRAM

## 2022-05-12 RX ORDER — OXYCODONE HYDROCHLORIDE 10 MG/1
10 TABLET ORAL
Status: DISCONTINUED | OUTPATIENT
Start: 2022-05-12 | End: 2022-05-12 | Stop reason: HOSPADM

## 2022-05-12 RX ORDER — OXYCODONE HYDROCHLORIDE 5 MG/1
5 TABLET ORAL
Status: DISCONTINUED | OUTPATIENT
Start: 2022-05-12 | End: 2022-05-12 | Stop reason: HOSPADM

## 2022-05-12 RX ADMIN — OXYCODONE HYDROCHLORIDE 10 MG: 10 TABLET ORAL at 08:24

## 2022-05-12 RX ADMIN — OXYCODONE HYDROCHLORIDE 10 MG: 10 TABLET ORAL at 04:45

## 2022-05-12 RX ADMIN — FAMOTIDINE 20 MG: 10 INJECTION INTRAVENOUS at 05:21

## 2022-05-12 RX ADMIN — OXYCODONE HYDROCHLORIDE 10 MG: 10 TABLET ORAL at 14:21

## 2022-05-12 RX ADMIN — ACETAMINOPHEN 1000 MG: 10 INJECTION, SOLUTION INTRAVENOUS at 04:46

## 2022-05-12 RX ADMIN — ENOXAPARIN SODIUM 40 MG: 40 INJECTION SUBCUTANEOUS at 09:48

## 2022-05-12 RX ADMIN — ACETAMINOPHEN 1000 MG: 500 TABLET ORAL at 09:48

## 2022-05-12 RX ADMIN — ONDANSETRON 4 MG: 2 INJECTION INTRAMUSCULAR; INTRAVENOUS at 08:17

## 2022-05-12 RX ADMIN — GABAPENTIN 300 MG: 300 CAPSULE ORAL at 12:54

## 2022-05-12 RX ADMIN — GABAPENTIN 300 MG: 300 CAPSULE ORAL at 05:19

## 2022-05-12 RX ADMIN — ONDANSETRON 4 MG: 2 INJECTION INTRAMUSCULAR; INTRAVENOUS at 01:38

## 2022-05-12 RX ADMIN — OXYCODONE 5 MG: 5 TABLET ORAL at 01:38

## 2022-05-12 RX ADMIN — POTASSIUM CHLORIDE AND SODIUM CHLORIDE: 900; 150 INJECTION, SOLUTION INTRAVENOUS at 08:04

## 2022-05-12 ASSESSMENT — ENCOUNTER SYMPTOMS
HEARTBURN: 0
ABDOMINAL PAIN: 1
DIARRHEA: 0
COUGH: 0
NAUSEA: 1
VOMITING: 0
FEVER: 0
SHORTNESS OF BREATH: 0
PALPITATIONS: 0
CHILLS: 0

## 2022-05-12 ASSESSMENT — COGNITIVE AND FUNCTIONAL STATUS - GENERAL
DAILY ACTIVITIY SCORE: 24
TURNING FROM BACK TO SIDE WHILE IN FLAT BAD: A LITTLE
SUGGESTED CMS G CODE MODIFIER MOBILITY: CK
WALKING IN HOSPITAL ROOM: A LITTLE
MOBILITY SCORE: 18
SUGGESTED CMS G CODE MODIFIER DAILY ACTIVITY: CH
MOVING FROM LYING ON BACK TO SITTING ON SIDE OF FLAT BED: A LITTLE
MOVING TO AND FROM BED TO CHAIR: A LITTLE
STANDING UP FROM CHAIR USING ARMS: A LITTLE
CLIMB 3 TO 5 STEPS WITH RAILING: A LITTLE

## 2022-05-12 ASSESSMENT — PAIN DESCRIPTION - PAIN TYPE
TYPE: ACUTE PAIN

## 2022-05-12 ASSESSMENT — COPD QUESTIONNAIRES
COPD SCREENING SCORE: 0
DO YOU EVER COUGH UP ANY MUCUS OR PHLEGM?: NO/ONLY WITH OCCASIONAL COLDS OR INFECTIONS
DURING THE PAST 4 WEEKS HOW MUCH DID YOU FEEL SHORT OF BREATH: NONE/LITTLE OF THE TIME
HAVE YOU SMOKED AT LEAST 100 CIGARETTES IN YOUR ENTIRE LIFE: NO/DON'T KNOW

## 2022-05-12 NOTE — PROGRESS NOTES
4 Eyes Skin Assessment Completed by FABRICIO Lan and FABRICIO Layne.    Head WDL  Ears WDL  Nose WDL  Mouth WDL  Neck WDL  Breast/Chest WDL  Shoulder Blades WDL  Spine WDL  (R) Arm/Elbow/Hand WDL  (L) Arm/Elbow/Hand WDL  Abdomen Incision, 4 abdominal lap sites, slightly bloody, open to air  Groin WDL  Scrotum/Coccyx/Buttocks WDL  (R) Leg WDL  (L) Leg WDL  (R) Heel/Foot/Toe WDL  (L) Heel/Foot/Toe WDL          Devices In Places Nasal Cannula      Interventions In Place Pillows    Possible Skin Injury No    Pictures Uploaded Into Epic Yes  Wound Consult Placed N/A  RN Wound Prevention Protocol Ordered No

## 2022-05-12 NOTE — CARE PLAN
The patient is Stable - Low risk of patient condition declining or worsening    Shift Goals  Clinical Goals: monitor vs, comfort  Patient Goals: rest, improve O2 sat  Family Goals: n/a    Progress made toward(s) clinical / shift goals:  Patient was educated on pain management and on their medications.  Problem: Pain - Standard  Goal: Alleviation of pain or a reduction in pain to the patient’s comfort goal  Outcome: Progressing     Problem: Knowledge Deficit - Standard  Goal: Patient and family/care givers will demonstrate understanding of plan of care, disease process/condition, diagnostic tests and medications  Outcome: Progressing       Patient is not progressing towards the following goals:

## 2022-05-12 NOTE — ANESTHESIA TIME REPORT
Anesthesia Start and Stop Event Times     Date Time Event    5/11/2022 1553 Ready for Procedure     1606 Anesthesia Start     1701 Anesthesia Stop        Responsible Staff  05/11/22    Name Role Begin End    Anshu Oscar M.D. Anesth 1606 1701        Overtime Reason:  no overtime (within assigned shift)    Comments: ERICK FIRST CALL

## 2022-05-12 NOTE — ANESTHESIA POSTPROCEDURE EVALUATION
Patient: Maeve Snider    Procedure Summary     Date: 05/11/22 Room / Location: [place] [address] / SURGERY [facility]    Anesthesia Start: 1606 Anesthesia Stop: 1701    Procedures:       GASTRECTOMY, SLEEVE, LAPAROSCOPIC (Abdomen)      REPAIR, HERNIA, HIATAL, LAPAROSCOPIC (Abdomen) Diagnosis: (MORBID OBESITY; hiatal hernia)    Surgeons: Silvio Vivas M.D. Responsible Provider: Anshu Oscar M.D.    Anesthesia Type: general ASA Status: 3          Final Anesthesia Type: general  Last vitals  BP   Blood Pressure: (!) 151/83    Temp   36.6 °C (97.9 °F)    Pulse   90   Resp   18    SpO2   96 %      Anesthesia Post Evaluation    Patient location during evaluation: PACU  Patient participation: complete - patient participated  Level of consciousness: awake and alert  Pain score: 1    Airway patency: patent  Anesthetic complications: no  Cardiovascular status: hemodynamically stable  Respiratory status: acceptable  Hydration status: euvolemic    PONV: none          No complications documented.

## 2022-05-12 NOTE — PROGRESS NOTES
Surgical Progress Note    Author: Cyndi Sharpe P.A.-C. Date & Time created: 2022   9:58 AM     Interval Events:  POD#1 Laparoscopic Sleeve Gastrectomy, Cruroplasty hiatal hernia repair.She is reporting some nausea. She has been sipping water ok. She denies vomiting. Admits to typical incisional abdominal pain, controlled with medication. Pt is ambulating in her room some and she is voiding.     Review of Systems   Constitutional: Negative for chills and fever.   Respiratory: Negative for cough and shortness of breath.    Cardiovascular: Negative for chest pain and palpitations.   Gastrointestinal: Positive for abdominal pain (incisional) and nausea. Negative for diarrhea, heartburn and vomiting.   Genitourinary: Negative for dysuria.     Hemodynamics:  Temp (24hrs), Av.6 °C (97.8 °F), Min:36.3 °C (97.3 °F), Max:37 °C (98.6 °F)  Temperature: 36.6 °C (97.9 °F)  Pulse  Av.3  Min: 84  Max: 109   Blood Pressure: (!) 143/86     Respiratory:    Respiration: 18, Pulse Oximetry: 94 %           Neuro:  GCS       Fluids:    Intake/Output Summary (Last 24 hours) at 2022 0958  Last data filed at 2022 1701  Gross per 24 hour   Intake 1200 ml   Output 15 ml   Net 1185 ml     Weight: 120 kg (263 lb 7.2 oz)  Current Diet Order   Procedures   • Diet Order Diet: Clear Liquid; Miscellaneous modifications: (optional): Bariatric     Physical Exam  Constitutional:       Appearance: Normal appearance. She is obese.   HENT:      Head: Normocephalic and atraumatic.      Nose: Nose normal.      Mouth/Throat:      Mouth: Mucous membranes are moist.   Eyes:      Conjunctiva/sclera: Conjunctivae normal.   Cardiovascular:      Rate and Rhythm: Normal rate and regular rhythm.   Pulmonary:      Effort: Pulmonary effort is normal. No respiratory distress.   Abdominal:      General: There is distension.      Palpations: Abdomen is soft.      Tenderness: There is abdominal tenderness. There is no guarding or rebound.    Musculoskeletal:         General: Normal range of motion.      Cervical back: Normal range of motion.   Skin:     General: Skin is warm and dry.      Coloration: Skin is not jaundiced.      Findings: No erythema or rash.   Neurological:      Mental Status: She is alert and oriented to person, place, and time.   Psychiatric:         Mood and Affect: Mood normal.       Labs:  Recent Results (from the past 24 hour(s))   HCG Qual Serum    Collection Time: 05/11/22  3:31 PM   Result Value Ref Range    Beta-Hcg Qualitative Serum Negative Negative   CBC WITH DIFFERENTIAL    Collection Time: 05/11/22  3:59 PM   Result Value Ref Range    WBC 8.6 4.8 - 10.8 K/uL    RBC 4.53 4.20 - 5.40 M/uL    Hemoglobin 13.9 12.0 - 16.0 g/dL    Hematocrit 40.1 37.0 - 47.0 %    MCV 88.5 81.4 - 97.8 fL    MCH 30.7 27.0 - 33.0 pg    MCHC 34.7 33.6 - 35.0 g/dL    RDW 37.7 35.9 - 50.0 fL    Platelet Count 245 164 - 446 K/uL    MPV 9.8 9.0 - 12.9 fL    Neutrophils-Polys 65.60 44.00 - 72.00 %    Lymphocytes 26.20 22.00 - 41.00 %    Monocytes 5.90 0.00 - 13.40 %    Eosinophils 1.50 0.00 - 6.90 %    Basophils 0.30 0.00 - 1.80 %    Immature Granulocytes 0.50 0.00 - 0.90 %    Nucleated RBC 0.00 /100 WBC    Neutrophils (Absolute) 5.63 2.00 - 7.15 K/uL    Lymphs (Absolute) 2.25 1.00 - 4.80 K/uL    Monos (Absolute) 0.51 0.00 - 0.85 K/uL    Eos (Absolute) 0.13 0.00 - 0.51 K/uL    Baso (Absolute) 0.03 0.00 - 0.12 K/uL    Immature Granulocytes (abs) 0.04 0.00 - 0.11 K/uL    NRBC (Absolute) 0.00 K/uL   PT    Collection Time: 05/11/22  3:59 PM   Result Value Ref Range    PT 12.9 12.0 - 14.6 sec    INR 1.00 0.87 - 1.13   Lipid Profile    Collection Time: 05/11/22  3:59 PM   Result Value Ref Range    Cholesterol,Tot 225 (H) 100 - 199 mg/dL    Triglycerides 247 (H) 0 - 149 mg/dL    HDL 36 (A) >=40 mg/dL     (H) <100 mg/dL   IRON    Collection Time: 05/11/22  3:59 PM   Result Value Ref Range    Iron 47 40 - 170 ug/dL   TRANSFERRIN    Collection Time:  05/11/22  3:59 PM   Result Value Ref Range    Transferrin 243 200 - 370 mg/dL   PREALBUMIN    Collection Time: 05/11/22  3:59 PM   Result Value Ref Range    Pre-Albumin 25.6 18.0 - 38.0 mg/dL   VITAMIN B12    Collection Time: 05/11/22  3:59 PM   Result Value Ref Range    Vitamin B12 -True Cobalamin 528 211 - 911 pg/mL   VITAMIN D,25 HYDROXY (DEFICIENCY)    Collection Time: 05/11/22  3:59 PM   Result Value Ref Range    25-Hydroxy   Vitamin D 25 44 30 - 100 ng/mL   FERRITIN    Collection Time: 05/11/22  3:59 PM   Result Value Ref Range    Ferritin 171.0 10.0 - 291.0 ng/mL   FOLATE    Collection Time: 05/11/22  3:59 PM   Result Value Ref Range    Folate -Folic Acid 21.1 >4.0 ng/mL   Comp Metabolic Panel    Collection Time: 05/11/22  3:59 PM   Result Value Ref Range    Sodium 138 135 - 145 mmol/L    Potassium 3.7 3.6 - 5.5 mmol/L    Chloride 102 96 - 112 mmol/L    Co2 23 20 - 33 mmol/L    Anion Gap 13.0 7.0 - 16.0    Glucose 84 65 - 99 mg/dL    Bun 12 8 - 22 mg/dL    Creatinine 0.58 0.50 - 1.40 mg/dL    Calcium 9.0 8.5 - 10.5 mg/dL    AST(SGOT) 17 12 - 45 U/L    ALT(SGPT) 17 2 - 50 U/L    Alkaline Phosphatase 81 30 - 99 U/L    Total Bilirubin 0.5 0.1 - 1.5 mg/dL    Albumin 4.2 3.2 - 4.9 g/dL    Total Protein 6.9 6.0 - 8.2 g/dL    Globulin 2.7 1.9 - 3.5 g/dL    A-G Ratio 1.6 g/dL   ESTIMATED GFR    Collection Time: 05/11/22  3:59 PM   Result Value Ref Range    GFR (CKD-EPI) 116 >60 mL/min/1.73 m 2   CBC without Differential (blood)    Collection Time: 05/12/22  1:15 AM   Result Value Ref Range    WBC 16.0 (H) 4.8 - 10.8 K/uL    RBC 4.33 4.20 - 5.40 M/uL    Hemoglobin 13.1 12.0 - 16.0 g/dL    Hematocrit 39.2 37.0 - 47.0 %    MCV 90.5 81.4 - 97.8 fL    MCH 30.3 27.0 - 33.0 pg    MCHC 33.4 (L) 33.6 - 35.0 g/dL    RDW 39.4 35.9 - 50.0 fL    Platelet Count 251 164 - 446 K/uL    MPV 9.7 9.0 - 12.9 fL   Comp Metabolic Panel (CMP)    Collection Time: 05/12/22  1:15 AM   Result Value Ref Range    Sodium 136 135 - 145 mmol/L     Potassium 4.6 3.6 - 5.5 mmol/L    Chloride 102 96 - 112 mmol/L    Co2 24 20 - 33 mmol/L    Anion Gap 10.0 7.0 - 16.0    Glucose 151 (H) 65 - 99 mg/dL    Bun 13 8 - 22 mg/dL    Creatinine 0.62 0.50 - 1.40 mg/dL    Calcium 8.4 (L) 8.5 - 10.5 mg/dL    AST(SGOT) 22 12 - 45 U/L    ALT(SGPT) 21 2 - 50 U/L    Alkaline Phosphatase 77 30 - 99 U/L    Total Bilirubin 0.3 0.1 - 1.5 mg/dL    Albumin 3.9 3.2 - 4.9 g/dL    Total Protein 7.0 6.0 - 8.2 g/dL    Globulin 3.1 1.9 - 3.5 g/dL    A-G Ratio 1.3 g/dL   ESTIMATED GFR    Collection Time: 05/12/22  1:15 AM   Result Value Ref Range    GFR (CKD-EPI) 114 >60 mL/min/1.73 m 2   Histology Request    Collection Time: 05/12/22  8:22 AM   Result Value Ref Range    Pathology Request Sent to Histo      Medical Decision Making, by Problem:  There are no active hospital problems to display for this patient.    Plan:  Pt is alert and oriented, NAD. Breathing unlabored. Some nausea, but tolerating some sips PO.  Incisions ok. VS stable. Labs reviewed.  Leukocytosis, likely reactive. Hb ok. Encouraged ambulation and incentive spirometry.  Wean O2. Pt may need to stay another night for nausea, pain control, hypoxia, will see how the day progresses. Otherwise okay for discharge later this afternoon. Pt also seen and examined by Dr. Vivas.    Quality Measures:  Quality-Core Measures   Reviewed items::  Labs reviewed  Ram catheter::  No Ram  DVT prophylaxis pharmacological::  Enoxaparin (Lovenox)  DVT prophylaxis - mechanical:  SCDs  Ulcer Prophylaxis::  Yes      Discussed patient condition with RN, Patient and Dr. Vivas

## 2022-05-12 NOTE — PROGRESS NOTES
Assumed care of patient at 2000 from Joana REGALADO. Patient is A&O x4, states pain level is 5/10. Bed locked in lowest position with 2 rail up. Call light  in place, belongings at bedside. Hourly rounding is in place.

## 2022-05-12 NOTE — DISCHARGE INSTRUCTIONS
Bariatric D/C instructions:    1. DIET: Follow the diet progression detailed in your post-op booklet.  Progressing as instructed will make for a smooth transition after surgery and prevent any pain associated with eating foods before your stomach is ready or eating too much.  Water and hydration is much more important than food intake the first week or two after surgery.  Drink enough water to keep your urine pale yellow.  Increase water intake if your urine is a darker yellow or if have burning with urination.  Nausea and vomiting once or twice after you leave the hospital is normal.  Sip fluids continually and stay hydrated.    2.  SUPPLEMENTS: Start your supplements 1 week after surgery.  You may need to cut some supplements in half initially.  Follow the guidelines from your supplement handout from your pre-op class.     3. ACTIVITIES: After discharge from the hospital, you may resume full routine activities. However, there should be no heavy lifting (greater than 15 pounds) and no strenuous activities until after your follow-up visit. Otherwise, routine activities of daily living are acceptable.  More movement and walking after surgery the better.    4. DRIVING: You may drive whenever you are off pain medications and are able to perform the activities needed to drive, i.e. turning, bending, twisting, etc.    5. BATHING AND WOUND CARE: You may get the wound wet 2 days after surgery. You may shower, but do not submerge in a bath for at least a week. Dressings may come off after 48 hours. Please leave the steri-strips in place, they will fall off over 5-7 days. You may notice some clear or slightly bloody drainage from your wounds and there may be some mild redness or bruising around your incision sites.  This is normal.    6. BOWEL FUNCTION: Constipation is common after an operation, especially with pain medications. The combination of pain medication and decreased activity level can cause constipation in  otherwise normal patients. If you feel this is occurring, take a laxative (Milk of Magnesia, Miralax, etc.) until the problem has resolved.  Diarrhea the first few days after surgery can also be normal.  You may notice that it is dark in color or see blood, which is also normal and should subside in the first week post-op.    7. PAIN MEDICATION: You have been given a prescription for pain medication preoperatively.  Please take these as directed. It is important to remember not to take medications on an empty stomach as this may cause nausea.  For minimal discomfort you may use liquid Tylenol 650 mg every 4 hours.  DO NOT exceed 4,000 mg of Tylenol in 24 hours.  DO NOT use non steroidal anti-inflamatory medication such as: Asprin, Ibuprofen, Advil, Motrin, Aleve, or steroids.  These medication can cause ulcers after weight loss surgery.    8.CALL IF YOU HAVE: (1) Fevers to more than 101.5 F, (2) leg pain or swelling (3) Drainage or fluid from incision that may be foul smelling, increased tenderness or soreness at the wound or the wound edges are no longer together, redness or swelling at the incision site. (4) Night Sweats (5) Shaking, Chills (6) Persistent Nausea or Vomiting for over 24 hours.  Use your anti nausea medication as prescribed. (7) Call 911 if sudden onset of chest pain or shortness of breath that does not improve with 5-10 min of rest.    9. APPOINTMENT: Contact our office at [phone] for a follow-up appointment in 1-2 weeks following your procedure.  Our office hours are Monday-Friday, 8am-5pm.  Please try to call during these hours when we are better able to assist you.    If you have any additional questions, please do not hesitate to call the office and speak to either myself or the physician on call.    Office address:  [facility].  [address] [place] [address] [address] [place] [address]    Discharge Instructions    Discharged to home by car with relative. Discharged via wheelchair,  hospital escort: Yes.  Special equipment needed: Not Applicable    Be sure to schedule a follow-up appointment with your primary care doctor or any specialists as instructed.     Discharge Plan:   Diet Plan: Discussed  Activity Level: Discussed  Confirmed Follow up Appointment: Patient to Call and Schedule Appointment  Confirmed Symptoms Management: Discussed  Medication Reconciliation Updated: Yes    I understand that a diet low in cholesterol, fat, and sodium is recommended for good health. Unless I have been given specific instructions below for another diet, I accept this instruction as my diet prescription.   Other diet: follow doctors recommended meal plan    Special Instructions: None    Is patient discharged on Warfarin / Coumadin?   No     Depression / Suicide Risk    As you are discharged from this [facility] facility, it is important to learn how to keep safe from harming yourself.    Recognize the warning signs:  Abrupt changes in personality, positive or negative- including increase in energy   Giving away possessions  Change in eating patterns- significant weight changes-  positive or negative  Change in sleeping patterns- unable to sleep or sleeping all the time   Unwillingness or inability to communicate  Depression  Unusual sadness, discouragement and loneliness  Talk of wanting to die  Neglect of personal appearance   Rebelliousness- reckless behavior  Withdrawal from people/activities they love  Confusion- inability to concentrate     If you or a loved one observes any of these behaviors or has concerns about self-harm, here's what you can do:  Talk about it- your feelings and reasons for harming yourself  Remove any means that you might use to hurt yourself (examples: pills, rope, extension cords, firearm)  Get professional help from the community (Mental Health, Substance Abuse, psychological counseling)  Do not be alone:Call your Safe Contact- someone whom you trust who will be there for  you.  Call your local CRISIS HOTLINE 032-2176 or 799-513-4017  Call your local Children's Mobile Crisis Response Team [place] (970) 037-7275 or www.[employer]  Call the toll free National Suicide Prevention Hotlines   National Suicide Prevention Lifeline 058-867-RUVS (6054)  [facility] 800-SUICIDE (530-5392)    Laparoscopic Gastric Bypass Surgery, Care After  This sheet gives you information about how to care for yourself after your procedure. Your health care provider may also give you more specific instructions. If you have problems or questions, contact your health care provider.  What can I expect after the procedure?  After the procedure, it is common to have:  Pain.  Fatigue.  Nausea.  Gas or heartburn.  Follow these instructions at home:  Eating and drinking    Drink enough fluid to keep your urine pale yellow.  Follow instructions from your health care provider or nutrition specialist (dietitian) about:  What to eat and how much to eat. This is very important.  How long you need to stay on a liquid diet.  Taking any vitamins or protein supplements.  You will begin by drinking liquids in small amounts. Liquids may include tea, juice, broth, or gelatin.  Do not drink caffeine for 1 month after the procedure, or for as long as told by your health care provider.  Do not drink alcohol.  Activity    For 4-6 weeks after the procedure, or for as long as told by your health care provider:  Rest as told by your health care provider.  Avoid sitting for a long time without moving. Get up to take short walks every 1-2 hours. This is important to improve blood flow and breathing. Ask for help if you feel weak or unsteady.  Do coughing and deep breathing exercises as told by your health care provider.  Return to your normal activities as told by your health care provider. Ask your health care provider what activities are safe for you.  Avoid activities that require a lot of effort.  Do not lift,  push, or pull anything that is heavier than 10 lb (4.5 kg), or the limit that you are told, until your health care provider says that it is safe.  Incision care    Follow instructions from your health care provider about how to take care of your incisions. Make sure you:  Wash your hands with soap and water before and after you change your bandages (dressings). If soap and water are not available, use hand .  Change your dressings as told by your health care provider.  Leave stitches (sutures), skin glue, or adhesive strips in place. These skin closures may need to be in place for 2 weeks or longer. If adhesive strip edges start to loosen and curl up, you may trim the loose edges. Do not remove adhesive strips completely unless your health care provider tells you to do that.  Do not take baths, swim, or use a hot tub until your health care provider says that it is okay. You may shower when your health care provider says that it is okay.  Pat your incisions dry. Do not rub them.  Check your incision areas every day for signs of infection. Check for:  Redness, swelling, or pain.  Fluid or blood.  Warmth.  Pus or a bad smell.  General instructions  Take over-the-counter and prescription medicines only as told by your health care provider.  Do not use any products that contain nicotine or tobacco, such as cigarettes, e-cigarettes, and chewing tobacco. If you need help quitting, ask your health care provider.  Do not drive or use heavy machinery while taking prescription pain medicine.  Keep all follow-up visits as told by your health care provider. This is important.  Contact a health care provider if:  You have a fever or chills.  You have a cough that does not go away.  You have pain that does not get better with medicine.  You feel nauseous or you vomit.  You have pain or discomfort with swallowing.  You have redness, swelling, or pain around your incisions.  You have fluid or blood coming from your  incisions.  Your incisions feel warm to the touch.  You have pus or a bad smell coming from your incisions.  You have diarrhea, nausea, or vomiting after eating (dumping syndrome).  Get help right away if:  You develop shortness of breath.  You develop chest pain.  You have very bad pain in your leg.  You have severe abdominal pain, nausea, and vomiting.  Summary  The laparoscopic gastric bypass surgery is a surgery to help you lose weight.  After surgery, you will need to rest, but you also must remember to walk and move around while at home.  Return to your normal activities as told by your health care provider. Ask your health care provider what activities are safe for you.  You will start eating again by drinking liquids in small amounts and progressing to solid foods over several weeks.  It is important to work closely with your physician and dietitian to assist you in your weight loss.  This information is not intended to replace advice given to you by your health care provider. Make sure you discuss any questions you have with your health care provider.  Document Released: 08/01/2005 Document Revised: 07/02/2019 Document Reviewed: 07/02/2019  [employer] Patient Education © 2020 [employer]

## 2022-05-12 NOTE — CARE PLAN
Problem: Pain - Standard  Goal: Alleviation of pain or a reduction in pain to the patient’s comfort goal  Outcome: Progressing     Problem: Knowledge Deficit - Standard  Goal: Patient and family/care givers will demonstrate understanding of plan of care, disease process/condition, diagnostic tests and medications  Outcome: Progressing   The patient is Stable - Low risk of patient condition declining or worsening    Shift Goals  Clinical Goals: monitor VS  Patient Goals: Rest, pain control    Progress made toward(s) clinical / shift goals:  Patient is at comfort goal, patient vitals are within normal limits.    Patient is not progressing towards the following goals:

## 2022-05-12 NOTE — DIETARY
NUTRITION SERVICES: BMI - Pt with BMI >40 (=Body mass index is 43.17 kg/m².), morbid obesity. Weight loss counseling not appropriate in acute care setting. Pt S/P lap sleeve gastrectomy, anticipate follow-up planned with MD/RD post D/C. RECOMMEND - Referral to outpatient nutrition services for weight management, or follow-up with MD/RD after D/C.

## 2022-05-15 LAB — ZINC BLD-MCNC: 675.2 UG/DL (ref 440–860)

## 2022-05-16 LAB
VIT B1 BLD-MCNC: 124 NMOL/L (ref 70–180)
VIT B6 SERPL-MCNC: 34.1 NMOL/L (ref 20–125)

## (undated) DEVICE — NEPTUNE 4 PORT MANIFOLD - (20/PK)

## (undated) DEVICE — DRAPESURG STERI-DRAPE LONG - (10/BX 4BX/CA)

## (undated) DEVICE — CANNULA W/SEAL 5X100 Z-THRE - ADED KII (12/BX)

## (undated) DEVICE — KIT ANESTHESIA W/CIRCUIT & 3/LT BAG W/FILTER (20EA/CA)

## (undated) DEVICE — SYSTEM CALIBARATION  GASTRECTOMY 40FR WITH BULB (5/BX)

## (undated) DEVICE — RELOAD WITH GRIPPING SURFACE TECHNOLOGY BLUE 60MM (12EA/BX)

## (undated) DEVICE — TROCAR SEPARATOR 15MMZTHREAD - (6/BX)

## (undated) DEVICE — TISSEEL 4ML ----MUST ORDER A MIN OF 6EA----

## (undated) DEVICE — STAPLER POWERED 60MM (3EA/BX)

## (undated) DEVICE — SUTURE 0 LIGATING REEL VICRYL PLUS (12PK/BX)

## (undated) DEVICE — RELOAD WITH GRIPPING SURFACE TECHNOLOGY GOLD 60MM (12EA/BX)

## (undated) DEVICE — TUBING CLEARLINK DUO-VENT - C-FLO (48EA/CA)

## (undated) DEVICE — GLOVE SZ 7 BIOGEL PI MICRO - PF LF (50PR/BX 4BX/CA)

## (undated) DEVICE — SODIUM CHL IRRIGATION 0.9% 1000ML (12EA/CA)

## (undated) DEVICE — HEAD HOLDER JUNIOR/ADULT

## (undated) DEVICE — SUTURE2-0 27IN VCRL ANTI VIOL (36PK/BX)

## (undated) DEVICE — SUTURE GENERAL

## (undated) DEVICE — ELECTRODE DUAL RETURN W/ CORD - (50/PK)

## (undated) DEVICE — SET EXTENSION WITH 2 PORTS (48EA/CA) ***PART #2C8610 IS A SUBSTITUTE*****

## (undated) DEVICE — TROCAR 5X100 NON BLADED Z-TH - READ KII (6/BX)

## (undated) DEVICE — SUCTION INSTRUMENT YANKAUER BULBOUS TIP W/O VENT (50EA/CA)

## (undated) DEVICE — GOWN WARMING X-LARGE FLEX - (20/CA)

## (undated) DEVICE — MASK ANESTHESIA ADULT  - (100/CA)

## (undated) DEVICE — BAG RETRIEVAL 12/15 MM INZII (5EA/CA) THIS WILL REPLACE ITEM 75018

## (undated) DEVICE — GLOVE BIOGEL PI INDICATOR SZ 7.0 SURGICAL PF LF - (50/BX 4BX/CA)

## (undated) DEVICE — SLEEVE, VASO, REPROC, LARGE

## (undated) DEVICE — SET TUBING PNEUMOCLEAR HIGH FLOW SMOKE EVACUATION (10EA/BX)

## (undated) DEVICE — CLIP APPLIER 10MM ENDO LARGE (3EA/BX)

## (undated) DEVICE — MAT PATIENT POSITIONING PREVALON (10EA/CA)

## (undated) DEVICE — LACTATED RINGERS INJ 1000 ML - (14EA/CA 60CA/PF)

## (undated) DEVICE — SUTURE 4-0 VICRYL PLUSFS-1 - 27 INCH (36/BX)

## (undated) DEVICE — PACK GASTRIC BANDING OR - (1/CA)

## (undated) DEVICE — PERISTRIP 60 STAPLE LINE REINFORCEMENT (6EA/CA)

## (undated) DEVICE — CANISTER SUCTION 3000ML MECHANICAL FILTER AUTO SHUTOFF MEDI-VAC NONSTERILE LF DISP  (40EA/CA)

## (undated) DEVICE — GOWN WARMING STANDARD FLEX - (30/CA)

## (undated) DEVICE — CHLORAPREP 26 ML APPLICATOR - ORANGE TINT(25/CA)

## (undated) DEVICE — SET LEADWIRE 5 LEAD BEDSIDE DISPOSABLE ECG (1SET OF 5/EA)

## (undated) DEVICE — ELECTRODE 850 FOAM ADHESIVE - HYDROGEL RADIOTRNSPRNT (50/PK)

## (undated) DEVICE — SENSOR SPO2 NEO LNCS ADHESIVE (20/BX) SEE USER NOTES

## (undated) DEVICE — RELOAD WITH GRIPPING SURGACE TECHNOLOGY GREEN 60MM (12EA/BX)

## (undated) DEVICE — PROTECTOR ULNA NERVE - (36PR/CA)

## (undated) DEVICE — TOWEL STOP TIMEOUT SAFETY FLAG (40EA/CA)

## (undated) DEVICE — CURVED JAW SEALER POWERSEAL 5MM 37CM

## (undated) DEVICE — APPLICATOR DUPLO SPRAYER (5EA/CA)